# Patient Record
Sex: MALE | Race: WHITE | Employment: UNEMPLOYED | URBAN - METROPOLITAN AREA
[De-identification: names, ages, dates, MRNs, and addresses within clinical notes are randomized per-mention and may not be internally consistent; named-entity substitution may affect disease eponyms.]

---

## 2018-09-07 ENCOUNTER — HOSPITAL ENCOUNTER (OUTPATIENT)
Dept: LAB | Age: 63
Discharge: HOME | End: 2018-09-07

## 2018-09-07 PROCEDURE — 88305 TISSUE EXAM BY PATHOLOGIST: CPT

## 2020-03-19 PROBLEM — E66.01 SEVERE OBESITY (HCC): Status: ACTIVE | Noted: 2020-03-19

## 2021-06-03 ENCOUNTER — HOSPITAL ENCOUNTER (OUTPATIENT)
Dept: CT IMAGING | Age: 66
Discharge: HOME | End: 2021-06-03
Attending: UROLOGY
Payer: MEDICARE

## 2021-06-03 DIAGNOSIS — C67.9 MALIGNANT NEOPLASM OF URINARY BLADDER, UNSPECIFIED SITE (HCC): ICD-10-CM

## 2021-06-03 PROCEDURE — 74177 CT ABD & PELVIS W/CONTRAST: CPT

## 2021-06-03 PROCEDURE — 74011000636 HC RX REV CODE- 636: Performed by: UROLOGY

## 2021-06-03 PROCEDURE — 74011000258 HC RX REV CODE- 258: Performed by: UROLOGY

## 2021-06-03 RX ORDER — SODIUM CHLORIDE 0.9 % (FLUSH) 0.9 %
10 SYRINGE (ML) INJECTION
Status: COMPLETED | OUTPATIENT
Start: 2021-06-03 | End: 2021-06-03

## 2021-06-03 RX ADMIN — SODIUM CHLORIDE 100 ML: 900 INJECTION, SOLUTION INTRAVENOUS at 12:49

## 2021-06-03 RX ADMIN — Medication 10 ML: at 12:49

## 2021-06-03 RX ADMIN — IOPAMIDOL 100 ML: 755 INJECTION, SOLUTION INTRAVENOUS at 12:49

## 2022-03-19 PROBLEM — E66.01 SEVERE OBESITY (HCC): Status: ACTIVE | Noted: 2020-03-19

## 2022-05-24 ENCOUNTER — PROCEDURE VISIT (OUTPATIENT)
Dept: UROLOGY | Age: 67
End: 2022-05-24
Payer: MEDICARE

## 2022-05-24 DIAGNOSIS — N40.1 BPH ASSOCIATED WITH NOCTURIA: ICD-10-CM

## 2022-05-24 DIAGNOSIS — R35.1 BPH ASSOCIATED WITH NOCTURIA: ICD-10-CM

## 2022-05-24 DIAGNOSIS — C67.9 MALIGNANT NEOPLASM OF URINARY BLADDER, UNSPECIFIED SITE (HCC): Primary | ICD-10-CM

## 2022-05-24 LAB
BILIRUBIN, URINE, POC: NEGATIVE
BLOOD URINE, POC: NORMAL
GLUCOSE URINE, POC: NEGATIVE
KETONES, URINE, POC: NEGATIVE
LEUKOCYTE ESTERASE, URINE, POC: NEGATIVE
NITRITE, URINE, POC: NEGATIVE
PH, URINE, POC: 7 (ref 4.6–8)
PROTEIN,URINE, POC: NEGATIVE
SPECIFIC GRAVITY, URINE, POC: 1.02 (ref 1–1.03)
UROBILINOGEN, POC: NORMAL

## 2022-05-24 PROCEDURE — 52000 CYSTOURETHROSCOPY: CPT | Performed by: UROLOGY

## 2022-05-24 PROCEDURE — 81003 URINALYSIS AUTO W/O SCOPE: CPT | Performed by: UROLOGY

## 2022-05-24 NOTE — PROGRESS NOTES
[facility]  [address]   [address]  [facility], [address]  [phone]    Alysia Clements  : 1955         HPI   74 y.o., male presents for cystoscopy. Previously diagnosed with TaG3 TCC of the bladder on 7/10/13. Normal upper tracts on 13 CT. Atypical cytology seen on 10/13 but FISH neg on 2014. 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 Last PSA was stable at 0.6 on 21. Last CT was 6/3/21 which showed BWT with normal upper tracts. Stable nocturia times two.  No new complaints. Past Medical History:   Diagnosis Date    A-fib [facility]) 2012    manged by [facility]    Anticoagulated on Coumadin     managed by [facility]    Bipolar disorder [facility])      managed with medications    Bladder carcinoma ([facility] [address]     continue to monitor  by Urologist    CAD (coronary artery disease)     [facility] [employer].  Cardiomyopathy ([facility] [address]     managed with medications    Diabetes mellitus type 2, noninsulin dependent ([address]     oral agents only- per pt's mother- fbs 130-150-/denies hypoglycemia/last A1c 6.8    H/O hyperthyroidism     monitored by [place]    Hx: UTI (urinary tract infection)     no recent episodes    Hypercholesterolemia     Hypertension     managed with meds    Hyperthyroidism 2012    [facility] 19 [facility]    Obesity (BMI 30-39. 9)     BMI 35.1    Occasional tremors     manged with medications in hands    CHEMA (obstructive sleep apnea)     no complaint with CPAP    Other and unspecified hyperlipidemia     managed with medications    Personal history of colonic polyps     adenomas    Poor historian     assessment done with mother    Psychotic disorder ([address]     Schizophrenia ([facility] [address]      [facility]     Past Surgical History:   Procedure Laterality Date    COLONOSCOPY  10/21/14    Jazmine--cecal biopsies negative, ICV TA, desc TA--3 year recall    CYST REMOVAL      X3    TONSILLECTOMY      UROLOGICAL SURGERY      turbt      Current Outpatient Medications   Medication Sig Dispense Refill    Lancets MISC Pt tests once daily dx code E11.9 pt uses one touch ultra      albuterol sulfate  (90 Base) MCG/ACT inhaler Inhale into the lungs      alogliptin (NESINA) 25 MG TABS tablet Take 25 mg by mouth daily      apixaban (ELIQUIS) 5 MG TABS tablet Take 5 mg by mouth 2 times daily      ARIPiprazole (ABILIFY) 30 MG tablet Take 30 mg by mouth daily      benztropine (COGENTIN) 1 MG tablet Take by mouth 3 times daily      carvedilol (COREG) 25 MG tablet Take 25 mg by mouth 2 times daily      cetirizine (ZYRTEC) 10 MG tablet Take by mouth      desoximetasone (TOPICORT) 0.25 % cream Apply topically 2 times daily      digoxin (LANOXIN) 125 MCG tablet Take 125 mcg by mouth daily      glipiZIDE (GLUCOTROL) 5 MG tablet Pt takes 1 and a half in am and same pm      latanoprost (XALATAN) 0.005 % ophthalmic solution Apply 1 drop to eye      lisinopril (PRINIVIL;ZESTRIL) 5 MG tablet Take by mouth daily      lithium 300 MG capsule Take 300 mg by mouth 2 times daily (with meals)      LORazepam (ATIVAN) 0.5 MG tablet Take 0.5 mg by mouth every 8 hours as needed.  metFORMIN (GLUCOPHAGE) 500 MG tablet Take by mouth daily (with breakfast)      simvastatin (ZOCOR) 20 MG tablet Take 80 mg by mouth      tamsulosin (FLOMAX) 0.4 MG capsule Take 0.4 mg by mouth      timolol (TIMOPTIC) 0.25 % ophthalmic solution Apply 1 drop to eye 2 times daily       No current facility-administered medications for this visit.      No Known Allergies  Social History     Socioeconomic History    Marital status:      Spouse name: Not on file    Number of children: Not on file    Years of education: Not on file    Highest education level: Not on file   Occupational History    Not on file   Tobacco Use    Smoking status: Former Smoker     Packs/day: 1.00     Quit date: 2000     Years since quittin.7    Smokeless tobacco: Never Used    Tobacco comment: Quit smoking: pt quiet about 20 years ago   Substance and Sexual Activity    Alcohol use: No    Drug use: No    Sexual activity: Not on file   Other Topics Concern    Not on file   Social History Narrative    On disability/ / 1 son     Social Determinants of Health     Financial Resource Strain:     Difficulty of Paying Living Expenses: Not on file   Food Insecurity:     Worried About Running Out of Food in the Last Year: Not on file    Cirilo of Food in the Last Year: Not on file   Transportation Needs:     Lack of Transportation (Medical): Not on file    Lack of Transportation (Non-Medical): Not on file   Physical Activity:     Days of Exercise per Week: Not on file    Minutes of Exercise per Session: Not on file   Stress:     Feeling of Stress : Not on file   Social Connections:     Frequency of Communication with Friends and Family: Not on file    Frequency of Social Gatherings with Friends and Family: Not on file    Attends Sabianist Services: Not on file    Active Member of [address] [place] [address] or Organizations: Not on file    Attends Club or Organization Meetings: Not on file    Marital Status: Not on file   Intimate Partner Violence:     Fear of Current or Ex-Partner: Not on file    Emotionally Abused: Not on file    Physically Abused: Not on file    Sexually Abused: Not on file   Housing Stability:     Unable to Pay for Housing in the Last Year: Not on file    Number of [place] in the Last Year: Not on file    Unstable Housing in the Last Year: Not on file     Family History   Problem Relation Age of Onset    Cancer Maternal Aunt     Cancer Father         lung    Heart Disease Mother     Diabetes Mother        There were no vitals taken for this visit.     UA - Dipstick  Results for orders placed or performed in visit on 05/24/22   AMB POC URINALYSIS DIP STICK AUTO W/O MICRO (PGU)   Result Value Ref Range    Glucose, Urine, POC Negative Negative    Bilirubin, Urine, POC Negative Negative    KETONES, Urine, POC Negative Negative    Specific Gravity, Urine, POC 1.025 1.001 - 1.035    Blood (UA POC) Small Negative    pH, Urine, POC 7.0 4.6 - 8.0    Protein, Urine, POC Negative Negative    Urobilinogen, POC 0.2 mg/dL     Nitrite, Urine, POC Negative Negative    Leukocyte Esterase, Urine, POC Negative Negative         UA - Micro  WBC - 0  RBC - 1-2  Bacteria - 0  Epith - 0          Cystoscopy Procedure:     Procedure Room # 3  Scope ID: [id number]  Assistant: rehan      All risks, benefits and alternatives were again reviewed with patient and he is willing to proceed at this time. His genital area was prepped and draped and a sterile field applied. 2% lidocaine jelly was injected in the the urethra and allowed to dwell for several minutes. A flexible cystoscope was then inserted into the urethral meatus and advanced under direct vision. The anterior and posterior urethra appeared normal in appearance. The prostatic urethra revealed bilobar hypertrophy. The bladder was systematically surveyed. No bladder trabeculations were seen. No mucosal abnormalities were seen. The ureteral orifices were seen in their normal orthotopic position. The cystoscope was then removed under direct vision. The patient tolerated the procedure well. Assessment and Plan    ICD-10-CM    1. Malignant neoplasm of urinary bladder, unspecified site (HCC)  C67.9 CYSTOURETHROSCOPY     AMB POC URINALYSIS DIP STICK AUTO W/O MICRO (PGU)   2. BPH associated with nocturia  N40.1     R35.1      No sign of recurrence. RTO in 12 mo for cysto with PSA prior.     GAY COLBERT, DO